# Patient Record
Sex: FEMALE | URBAN - METROPOLITAN AREA
[De-identification: names, ages, dates, MRNs, and addresses within clinical notes are randomized per-mention and may not be internally consistent; named-entity substitution may affect disease eponyms.]

---

## 2024-03-06 ENCOUNTER — TELEPHONE (OUTPATIENT)
Dept: NUTRITION | Facility: HOSPITAL | Age: 64
End: 2024-03-06

## 2024-03-06 NOTE — TELEPHONE ENCOUNTER
Pt telephoned regarding participation in the [employer] [facility] program. Referred to me for assistance with weight loss as BMI for surgery is set at 40 .     Pt current weight/BMI is:   Wt: 225# , Ht: 5'1  BMI: 42.6  Goal wt: <210#     Estimated needs for weight loss based on sedentary activity pattern:  Weight maintenance needs MSJ: x=kcal, g (g/kg)  Weight loss needs    kcal, g (.g/kg)     Diet History: Pt is trying to reduce her intake of processed carbs and pop. She had been trying to change her diet prior to speaking with [facility] about 5 months ago but not getting the same results for weight loss since she started. She mentions that she skips meals regularly and only eats about one meal per day.     Recommendations established at this visit:  1)  Recommended setting up an appointment with a local dietitian to discuss current diet and get advice on ways to support weight loss.